# Patient Record
Sex: MALE | Race: WHITE
[De-identification: names, ages, dates, MRNs, and addresses within clinical notes are randomized per-mention and may not be internally consistent; named-entity substitution may affect disease eponyms.]

---

## 2021-03-26 ENCOUNTER — HOSPITAL ENCOUNTER (OUTPATIENT)
Dept: HOSPITAL 61 - LAB | Age: 66
End: 2021-03-26
Attending: SPECIALIST
Payer: COMMERCIAL

## 2021-03-26 DIAGNOSIS — Z01.812: Primary | ICD-10-CM

## 2021-03-26 DIAGNOSIS — Z20.822: ICD-10-CM

## 2021-03-26 DIAGNOSIS — R22.1: ICD-10-CM

## 2021-03-26 PROCEDURE — U0003 INFECTIOUS AGENT DETECTION BY NUCLEIC ACID (DNA OR RNA); SEVERE ACUTE RESPIRATORY SYNDROME CORONAVIRUS 2 (SARS-COV-2) (CORONAVIRUS DISEASE [COVID-19]), AMPLIFIED PROBE TECHNIQUE, MAKING USE OF HIGH THROUGHPUT TECHNOLOGIES AS DESCRIBED BY CMS-2020-01-R: HCPCS

## 2021-03-30 ENCOUNTER — HOSPITAL ENCOUNTER (OUTPATIENT)
Dept: HOSPITAL 61 - SURG | Age: 66
Discharge: HOME | End: 2021-03-30
Attending: SPECIALIST
Payer: MEDICARE

## 2021-03-30 VITALS — SYSTOLIC BLOOD PRESSURE: 125 MMHG | DIASTOLIC BLOOD PRESSURE: 75 MMHG

## 2021-03-30 DIAGNOSIS — Z98.890: ICD-10-CM

## 2021-03-30 DIAGNOSIS — R22.1: Primary | ICD-10-CM

## 2021-03-30 DIAGNOSIS — Z87.891: ICD-10-CM

## 2021-03-30 DIAGNOSIS — Z79.899: ICD-10-CM

## 2021-03-30 DIAGNOSIS — C44.42: ICD-10-CM

## 2021-03-30 PROCEDURE — 88305 TISSUE EXAM BY PATHOLOGIST: CPT

## 2021-03-30 PROCEDURE — 11621 EXC S/N/H/F/G MAL+MRG 0.6-1: CPT

## 2021-03-30 RX ADMIN — LIDOCAINE HYDROCHLORIDE,EPINEPHRINE BITARTRATE ONE ML: 20; .01 INJECTION, SOLUTION INFILTRATION; PERINEURAL at 11:27

## 2021-03-30 NOTE — DISCH
DISCHARGE INSTRUCTIONS


Condition on Discharge


Condition on Discharge:  Stable





Activity After Discharge


Activity Instructions for Disc:  Avoid exertion





Diet after Discharge


Additional Diet Restrictions:  diet as desired





Wound Incision Care


Other wound/incision instructi:  keep wound dry and clean





Follow-Up


Follow up with:  call  and make appt to see me in 7 days.











SHEILA COVINGTON MD               Mar 30, 2021 12:26

## 2021-03-30 NOTE — OP
DATE OF SURGERY:  03/30/2021



SURGEON:  Osmani Covington MD



PREOPERATIVE DIAGNOSIS:  Mass of the left neck.



POSTOPERATIVE DIAGNOSIS:  Mass of the left neck with skin lesion.



ANESTHESIA:  1%  lidocaine with epinephrine.



PROCEDURE:  Excision of mass, left neck.



TECHNIQUE:  Under local anesthesia, the area had been properly prepped and

draped in a routine fashion with Betadine solution.  We had injected after that

was draped and prepped with 1% lidocaine with epinephrine around the area.  The

lesion in question was probably about 2 cm in size and as such we made a wide

incision in a transverse fashion following the skin lines, this was at the

lateral portion of the neck, right in the middle of the neck.  We made this in a

fusiform fashion encompassing this area.  We went well around the mass being a

centimeter or more pass all edges.  The incision totally was about 3 inches in

length.  The #15 blade was used to incise the skin in a fusiform fashion

transversely following the skin lines of the neck.  We did this as stated before

going widely around the mass.  We then carried this down through the skin and

using Metzenbaum scissors slowly took some of the subcutaneous along with the

skin off.  We put a stitch at the posterior aspect of this and sent this to

pathology.  The resultant defect was inspected.  There was surprisingly little

bleeding.  One small suture of 4-0 Vicryl was used to approximate and close one

bleeder on the cephalad portion of the mid portion of the wound.  This was in

the subcutaneous.  We inspected the area.  There were no further bleeding.  We

did undermine the subcutaneous just a little bit cephalad and caudad so that we

could close this without too much tension and using interrupted 4-0 Vicryl to

take deep the subcutaneous and deep dermis to approximate the areas.  We then

closed the wound with interrupted 5-0 nylon.  Sterile dressing was applied and

the procedure was terminated as a specimen had been sent to pathology.  No

drains were used.  The blood loss was  probably 2-3 mL.



FLUIDS GIVEN:  None.



Condition of the patient was satisfactory as he has returned to the holding

area.

 



______________________________

OSMANI COVINGTON MD



DR:  KAMLESH/martin  JOB#:  [id number] / [id number]

DD:  03/30/2021 21:40  DT:  03/30/2021 21:53

MTDD

## 2021-03-30 NOTE — PDOC
SURGICAL PROGRESS NOTE


DATE: 3/30/21 


TIME: 12:14


 No change in dictated H&P


Vital Signs





Vital Signs








  Date Time  Temp Pulse Resp B/P (MAP) Pulse Ox O2 Delivery O2 Flow Rate FiO2


 


3/30/21 10:36  67 20  99   











Justicifation of Admission Dx:


Justifications for Admission:


Justification of Admission Dx:  Yes











SHEILA COVINGTON MD               Mar 30, 2021 12:15

## 2021-03-30 NOTE — PREOP HP
DATE OF SERVICE:  03/30/2021



HISTORY OF PRESENT ILLNESS:  The patient is referred to me by  ____, the

dermatologist because of a lesion of the left neck.  Apparently, it has been

there for about 3 years, enlarging and he thought it should be removed widely.



PAST MEDICAL HISTORY:  Shows normal childhood diseases.  No high blood pressure,

cancer, TB, asthma or diabetes.



MEDICATIONS:  He takes no medication.   He does not take anticoagulants.



SOCIAL HISTORY:  Drinks only socially and does not use illicit drugs and does

not smoke.  He stopped smoking cigarettes about 30 years ago.



ALLERGIES:  He has no allergies. 



REVIEW OF SYSTEMS:  Negative except for the lesion at the neck, which is not

painful, but it is just not going away and crust over it at.



PHYSICAL EXAMINATION:

GENERAL:  Shows an alert male, in no acute distress.

HEAD, EYES, NOSE AND THROAT:  Grossly normal.

CHEST:  Clear bilaterally to auscultation.

HEART:  Had no murmurs, heaves, friction rubs or thrills at a rate of 70 beats

per minute and it was regular.

ABDOMEN:  Soft, no organomegaly, no masses.

NECK:  Examination of the neck; however, on the skin at the posterolateral

portion of the neck, there is a lesion which is more transverse in nature and it

is about 3-4 cm in length.  It appears to be crusting over and the dermatologist

thought it may well be malignant, it has not been biopsied recently, the

dermatologist it should be widely excised.



IMPRESSION AND PLAN:  Tumor, left neck.  Plan is to remove this widely under

local anesthesia as an outpatient.

 



______________________________

SHEILA COVINGTON MD



DR:  KAMLESH/nts  JOB#:  [id number] / [id number]

DD:  03/24/2021 15:27  DT:  03/24/2021 15:43

YESSENIA

## 2021-03-30 NOTE — PDOC
SURGICAL PROGRESS NOTE


DATE: 3/30/21 


TIME: 12:15


 Op Note:





Surgeon............................................Fabian


Pre op diag........................................tumor left neck


post op diag......................................Tumor left neck


Anesthesia.......................................1% lidocaine with epi


Procedure..........................................wide excision mass left neck


Blood loss..........................................5cc


Fluids................................................none


Drains...............................................none


condition...........................................satisfactory


Vital Signs





Vital Signs








  Date Time  Temp Pulse Resp B/P (MAP) Pulse Ox O2 Delivery O2 Flow Rate FiO2


 


3/30/21 10:36  67 20  99   











Justicifation of Admission Dx:


Justifications for Admission:


Justification of Admission Dx:  Yes











SHEILA COVINGTON MD               Mar 30, 2021 12:18

## 2021-04-05 NOTE — PATHOLOGY
[facility] Accession Number: [id number]

.                                                                01

Material submitted:                                        .

neck - MASS LEFT NECK. Modifiers: left

.                                                                01

Clinical history:                                          .

EXC MASS NECK

SUTURE AT POSTERIOR EDGE

.                                                                02

**********************************************************************

Diagnosis:

Skin "mass left neck", excisional biopsy:

 - INVASIVE SQUAMOUS CELL CARCINOMA, WELL-DIFFERENTIATED; COMPLETELY

    EXCISED.

(MLK:jaren; 04/02/2021)

MBR  04/05/2021  1213 Local

**********************************************************************

.                                                                02

Electronically signed:                                     .

Marlon Agosto MD, Pathologist

NPI- [id number]

.                                                                01

Gross description:                                         .

The specimen is received in formalin, labeled "Vargas George, mass

left neck".  Received is an oriented ellipse of skin measuring 4.5 x 1.6 x

0.4 cm in greatest dimensions with a suture placed at one tip designating

this as the posterior edge, which will further be designated as the 3:00

margin.  The surgical margins are inked as follows: 12 to 3:00-yellow, 3

to 9:00-black, and 9 to 12:00-blue.  The epidermal surface displays a

well-circumscribed, flat and white-tan lesion measuring 1.0 x 0.6 cm.  The

specimen is sectioned into 14 pieces and entirely submitted in cassettes

A1 through A4, with the 3:00 and 9:00 aspects placed in cassette A4.

(CAA; 3/30/2021)

QAC/QAC  03/30/2021  1737 Local

.                                                                02

Pathologist provided ICD-10:

C44.42

.                                                                02

CPT                                                        .

250256

Specimen Comment: A courtesy copy of this report has been sent to [phone], [phone]

Specimen Comment: [id number]

Specimen Comment: Report sent to  / DR ESCOBAR

***Performed at:  01

   [facility]

   [address]  [id number]

   MD Antony Real MD Phone:  [phone]

***Performed at:  02

   [facility]

   [address]  [id number]

   MD Spencer Kerley MD Phone:  [phone]